# Patient Record
Sex: FEMALE | URBAN - METROPOLITAN AREA
[De-identification: names, ages, dates, MRNs, and addresses within clinical notes are randomized per-mention and may not be internally consistent; named-entity substitution may affect disease eponyms.]

---

## 2019-03-27 ENCOUNTER — Encounter (OUTPATIENT)
Dept: URBAN - METROPOLITAN AREA CLINIC 71 | Facility: CLINIC | Age: 74
End: 2019-03-27
Payer: COMMERCIAL

## 2019-03-27 PROCEDURE — 92014 COMPRE OPH EXAM EST PT 1/>: CPT | Performed by: OPTOMETRIST

## 2019-09-11 ENCOUNTER — Encounter (OUTPATIENT)
Dept: URBAN - METROPOLITAN AREA CLINIC 71 | Facility: CLINIC | Age: 74
End: 2019-09-11
Payer: COMMERCIAL

## 2019-09-11 PROCEDURE — 92014 COMPRE OPH EXAM EST PT 1/>: CPT | Performed by: OPTOMETRIST

## 2019-10-03 ENCOUNTER — Encounter (OUTPATIENT)
Dept: URBAN - METROPOLITAN AREA CLINIC 71 | Facility: CLINIC | Age: 74
End: 2019-10-03
Payer: COMMERCIAL

## 2019-10-03 PROCEDURE — 92015 DETERMINE REFRACTIVE STATE: CPT | Performed by: OPTOMETRIST

## 2020-02-26 ENCOUNTER — OFFICE VISIT (OUTPATIENT)
Dept: URBAN - METROPOLITAN AREA CLINIC 71 | Facility: CLINIC | Age: 75
End: 2020-02-26
Payer: COMMERCIAL

## 2020-02-26 DIAGNOSIS — H43.813 VITREOUS DEGENERATION, BILATERAL: ICD-10-CM

## 2020-02-26 PROCEDURE — 92014 COMPRE OPH EXAM EST PT 1/>: CPT | Performed by: OPTOMETRIST

## 2020-02-26 ASSESSMENT — INTRAOCULAR PRESSURE
OD: 15
OS: 14

## 2020-02-26 NOTE — IMPRESSION/PLAN
Impression: Combined forms of age-related cataract, bilateral: H25.813. Bilateral. Plan: Cataracts account for some decreased vision, however, the patient is aware with macular changes vision will fluctuate.

## 2020-02-26 NOTE — IMPRESSION/PLAN
Impression: Vitreous degeneration, bilateral: H43.813. Bilateral.
Optos done 02/26/2020 Plan: Stable, observe.

## 2020-02-26 NOTE — IMPRESSION/PLAN
Impression: Type 2 diabetes mellitus without complications: N68.4. Bilateral. Plan: The clinical exam is consistent with Type 2 DM without retinopathy. The patient was advised to maintain tight blood sugar, blood pressure, and lipid control, and to see us again in 1 year for a repeat dilated fundus examination.

## 2020-02-26 NOTE — IMPRESSION/PLAN
Impression: Nonexudative age-related macular degeneration, early dry stage: H35.3191. Bilateral.
OCT done 02/26/2020- 248, 248 Plan: The clinical exam and OCT are consistent with dry age-related macular degeneration. The diagnosis, natural history, and prognosis of dry AMD were discussed. The patient was instructed to begin taking AREDS protocol antioxidant vitamins. The use of an Amsler grid and the importance of weekly self-monitoring were reviewed. The patient was instructed to call our office immediately upon any decreased vision or metamorphopsia.

## 2020-08-25 ENCOUNTER — OFFICE VISIT (OUTPATIENT)
Dept: URBAN - METROPOLITAN AREA CLINIC 71 | Facility: CLINIC | Age: 75
End: 2020-08-25
Payer: COMMERCIAL

## 2020-08-25 PROCEDURE — 92014 COMPRE OPH EXAM EST PT 1/>: CPT | Performed by: OPTOMETRIST

## 2020-08-25 PROCEDURE — 92134 CPTRZ OPH DX IMG PST SGM RTA: CPT | Performed by: OPTOMETRIST

## 2020-08-25 ASSESSMENT — INTRAOCULAR PRESSURE
OS: 12
OD: 10

## 2020-08-25 ASSESSMENT — KERATOMETRY
OD: 46.50
OS: 45.75

## 2020-08-25 NOTE — IMPRESSION/PLAN
Impression: Type 2 diabetes mellitus without complications: O35.0. Bilateral. stable Plan: The clinical exam is consistent with Type 2 DM without retinopathy. The patient was advised to maintain tight blood sugar, blood pressure, and lipid control, and to see us again in 1 year for a repeat dilated fundus examination.

## 2020-08-25 NOTE — IMPRESSION/PLAN
Impression: Combined forms of age-related cataract, bilateral: H25.813. Bilateral. will discuss cataract sx and will glare and ref at pt's convenience   Plan: Cataracts account for some decreased vision, however, the patient is aware with macular changes vision will fluctuate.

## 2020-08-25 NOTE — IMPRESSION/PLAN
Impression: Nonexudative age-related macular degeneration, early dry stage: H35.3191. Bilateral.
OCT done 8/25/20 stable- drusen Plan: Discussed diagnosis in detail with patient. Discussed treatment options with patient. Will continue to observe condition and or symptoms. Vitamins recommended. Call if [address] worsens.

## 2020-10-13 ENCOUNTER — OFFICE VISIT (OUTPATIENT)
Dept: URBAN - METROPOLITAN AREA CLINIC 71 | Facility: CLINIC | Age: 75
End: 2020-10-13
Payer: COMMERCIAL

## 2020-10-13 DIAGNOSIS — H52.4 PRESBYOPIA: Primary | ICD-10-CM

## 2020-10-13 PROCEDURE — 99212 OFFICE O/P EST SF 10 MIN: CPT | Performed by: OPTOMETRIST

## 2020-10-13 PROCEDURE — 92012 INTRM OPH EXAM EST PATIENT: CPT | Performed by: OPTOMETRIST

## 2020-10-13 ASSESSMENT — INTRAOCULAR PRESSURE
OS: 10
OD: 10

## 2020-10-13 ASSESSMENT — VISUAL ACUITY
OS: 20/30
OD: 20/30

## 2021-04-13 ENCOUNTER — OFFICE VISIT (OUTPATIENT)
Dept: URBAN - METROPOLITAN AREA CLINIC 71 | Facility: CLINIC | Age: 76
End: 2021-04-13
Payer: COMMERCIAL

## 2021-04-13 DIAGNOSIS — H35.3191 NONEXUDATIVE AGE-RELATED MACULAR DEGENERATION, UNSPECIFIED EYE, EARLY DRY STAGE: Primary | ICD-10-CM

## 2021-04-13 DIAGNOSIS — H25.813 COMBINED FORMS OF AGE-RELATED CATARACT, BILATERAL: ICD-10-CM

## 2021-04-13 DIAGNOSIS — E11.9 TYPE 2 DIABETES MELLITUS WITHOUT COMPLICATIONS: ICD-10-CM

## 2021-04-13 PROCEDURE — 92133 CPTRZD OPH DX IMG PST SGM ON: CPT | Performed by: OPTOMETRIST

## 2021-04-13 PROCEDURE — 92134 CPTRZ OPH DX IMG PST SGM RTA: CPT | Performed by: OPTOMETRIST

## 2021-04-13 PROCEDURE — 99214 OFFICE O/P EST MOD 30 MIN: CPT | Performed by: OPTOMETRIST

## 2021-04-13 ASSESSMENT — INTRAOCULAR PRESSURE
OS: 15
OD: 12

## 2021-04-13 NOTE — IMPRESSION/PLAN
Impression: Type 2 diabetes mellitus without complications: S06.5 Bilateral. Plan: The clinical exam is consistent with Type 2 DM without retinopathy. The patient was advised to maintain tight blood sugar, blood pressure, and lipid control, and to see us again in 1 year for a repeat dilated fundus examination.

## 2021-04-13 NOTE — IMPRESSION/PLAN
Impression: Vitreous degeneration, bilateral: H43.813 Bilateral. Plan: The PVD is stable, and there is no evidence of a retinal tear or detachment on dilated exam.  I again reviewed the signs and symptoms of a retinal tear and detachment in detail with the patient, including worsening flashes, new floaters, and development of a shadow/curtain in the peripheral visual field. The patient was advised to call immediately with any changes to [address] [facility] or increase in symptoms.

## 2021-04-13 NOTE — IMPRESSION/PLAN
Impression: Combined forms of age-related cataract, bilateral: H25.813 Bilateral. Plan: Vision stable OU. No treatment recommended at this time. PT reports her vision is still stable.  Will continue to monitor until vision/glare worsens

## 2021-04-13 NOTE — IMPRESSION/PLAN
Impression: Nonexudative age-related macular degeneration, unspecified eye, early dry stage: H35.3191. Plan: The clinical exam and OCT are stable without evidence of progression. The patient was advised to continue AREDs multivitamins. The patient was also advised to continue to monitor the Amsler grid, avoid smoking, and call us immediately with any visual changes.

## 2021-10-13 ENCOUNTER — OFFICE VISIT (OUTPATIENT)
Dept: URBAN - METROPOLITAN AREA CLINIC 71 | Facility: CLINIC | Age: 76
End: 2021-10-13
Payer: COMMERCIAL

## 2021-10-13 PROCEDURE — 92014 COMPRE OPH EXAM EST PT 1/>: CPT | Performed by: OPTOMETRIST

## 2021-10-13 ASSESSMENT — VISUAL ACUITY
OD: 20/25
OS: 20/25

## 2021-10-13 ASSESSMENT — INTRAOCULAR PRESSURE
OD: 8
OS: 8

## 2021-10-13 NOTE — IMPRESSION/PLAN
Impression: Presbyopia: H52.4. Plan: Presbyopia is the inability to focus on objects (ie: accommodate) due to the loss of flexibility of your natural lens. Presbyopia occurs with age. Reading glasses, bifocals, trifocals or contacts can be helpful. Contact the office if difficulty focusing persists despite corrective eye wear. New glasses RX given today for bifocals. Continue to follow annually for vision exams.

## 2021-10-13 NOTE — IMPRESSION/PLAN
Impression: Combined forms of age-related cataract, bilateral: H25.813 Bilateral. Plan: Cataracts are moderate and discussed the option of CAT SX vs waiting. She notes her vision does not bother her at this time and wants to wait another year. No treatment currently recommended. The patient will monitor vision changes and contact us with any decrease in vision.

## 2021-10-13 NOTE — IMPRESSION/PLAN
Impression: Nonexudative age-related macular degeneration, bilateral, early dry stage: H35.3131. Plan: The clinical exam and OCT are stable without evidence of progression. The patient was advised to continue AREDs multivitamins. The patient was also advised to continue to monitor the Amsler grid and call us immediately with any visual changes. 
Will have PT back N/A for continued observation of AMD OU

## 2022-04-05 ENCOUNTER — OFFICE VISIT (OUTPATIENT)
Dept: URBAN - METROPOLITAN AREA CLINIC 71 | Facility: CLINIC | Age: 77
End: 2022-04-05
Payer: COMMERCIAL

## 2022-04-05 DIAGNOSIS — H35.3131 NONEXUDATIVE AGE-RELATED MACULAR DEGENERATION, BILATERAL, EARLY DRY STAGE: Primary | ICD-10-CM

## 2022-04-05 PROCEDURE — 99214 OFFICE O/P EST MOD 30 MIN: CPT | Performed by: OPTOMETRIST

## 2022-04-05 PROCEDURE — 92134 CPTRZ OPH DX IMG PST SGM RTA: CPT | Performed by: OPTOMETRIST

## 2022-04-05 NOTE — IMPRESSION/PLAN
Impression: Type 2 diabetes mellitus without complications: M40.4 Bilateral. Plan: The clinical exam is consistent with Type 2 DM without retinopathy. The patient was advised to maintain tight blood sugar, blood pressure, and lipid control, and to see us again in 1 year for a repeat dilated fundus examination.

## 2022-04-05 NOTE — IMPRESSION/PLAN
Impression: Combined forms of age-related cataract, bilateral: H25.813 Bilateral. Plan: Cataracts are stable and PT notes her vision is not bothering her at this time. No treatment currently recommended. The patient will monitor vision changes and contact us with any decrease in vision.

## 2022-04-05 NOTE — IMPRESSION/PLAN
Impression: Nonexudative age-related macular degeneration, bilateral, early dry stage: H35.3131. Plan: The clinical exam and OCT are stable without evidence of progression. The patient was advised to continue the preservision vitamins. The patient was also advised to continue to monitor the Amsler grid and call us immediately with any visual changes.

## 2023-04-11 ENCOUNTER — OFFICE VISIT (OUTPATIENT)
Dept: URBAN - METROPOLITAN AREA CLINIC 71 | Facility: CLINIC | Age: 78
End: 2023-04-11
Payer: MEDICARE

## 2023-04-11 DIAGNOSIS — H25.813 COMBINED FORMS OF AGE-RELATED CATARACT, BILATERAL: ICD-10-CM

## 2023-04-11 DIAGNOSIS — E11.9 TYPE 2 DIABETES MELLITUS WITHOUT COMPLICATIONS: Primary | ICD-10-CM

## 2023-04-11 DIAGNOSIS — H43.813 VITREOUS DEGENERATION, BILATERAL: ICD-10-CM

## 2023-04-11 DIAGNOSIS — H35.3191 NONEXUDATIVE AGE-RELATED MACULAR DEGENERATION, EARLY DRY STAGE: ICD-10-CM

## 2023-04-11 DIAGNOSIS — H52.4 PRESBYOPIA: ICD-10-CM

## 2023-04-11 PROCEDURE — 99214 OFFICE O/P EST MOD 30 MIN: CPT | Performed by: OPTOMETRIST

## 2023-04-11 ASSESSMENT — INTRAOCULAR PRESSURE
OS: 13
OD: 17

## 2023-04-11 ASSESSMENT — VISUAL ACUITY
OD: 20/30
OS: 20/30

## 2023-04-11 NOTE — IMPRESSION/PLAN
Impression: Vitreous degeneration, bilateral: H43.813. PVD, floaters present OU, Stable OU. Plan: Continue to Monitor annually. Posterior vitreous detachments (PVD) usually diminish with time, but it may take several months. They rarely disappear entirely. PVD is a normal aging change due to vitreoius jelly pulling away from the retinal lining of the eye. They may accompany retinal tears, holes or detachments, so a dilated exam is important. Contact the office if symptoms worsen, including increased floaters, flashing light, loss of vision or a black curtain blocking your field of vision occurs.

## 2023-04-11 NOTE — IMPRESSION/PLAN
Impression: Type 2 diabetes mellitus without complications: A23.7 Bilateral.
No diabetic Retinopathy found today. OU stable. Plan: Monitor Annually.

## 2023-04-11 NOTE — IMPRESSION/PLAN
Impression: Combined forms of age-related cataract, bilateral: H25.813 Bilateral.
Patient VA is still at 20/30 with correction. OU stable. Offered cat sx when patient is ready. Plan: Cataracts account for the patient's complaints. No treatment currently recommended. The patient will monitor vision changes and contact us with any decrease in vision.

## 2023-04-11 NOTE — IMPRESSION/PLAN
Impression: Nonexudative age-related macular degeneration, early dry stage: H35.3191 Bilateral.
Small drusen and pigmentary change OU has held stable since lat year. Plan: Drusen may be an early sign of macular degeneration and this diagnosis may be possible in the future. There are no specific modalities proven to be effective to treat drusen. A diet rich in green vegetables is recommended. It is important to follow the amsler grid and report any changes such as increase in distortion or blurring of lines. If drusen progress they can cause decrease in vision or distortion of vision. Avoidance of smoking is recommended. Contact the office if you experience decrease in vision, blurred vision, distoriton in vision or concerning changes in vision. Take eye vitamins and check amsler grid daily. Monitor Annually.

## 2024-02-27 ENCOUNTER — OFFICE VISIT (OUTPATIENT)
Dept: URBAN - METROPOLITAN AREA CLINIC 71 | Facility: CLINIC | Age: 79
End: 2024-02-27
Payer: MEDICARE

## 2024-02-27 DIAGNOSIS — H43.813 VITREOUS DEGENERATION, BILATERAL: ICD-10-CM

## 2024-02-27 DIAGNOSIS — H52.4 PRESBYOPIA: ICD-10-CM

## 2024-02-27 DIAGNOSIS — H25.813 COMBINED FORMS OF AGE-RELATED CATARACT, BILATERAL: ICD-10-CM

## 2024-02-27 DIAGNOSIS — H35.3191 NONEXUDATIVE AGE-RELATED MACULAR DEGENERATION, UNSPECIFIED EYE, EARLY DRY STAGE: Primary | ICD-10-CM

## 2024-02-27 DIAGNOSIS — E11.9 TYPE 2 DIABETES MELLITUS WITHOUT COMPLICATIONS: ICD-10-CM

## 2024-02-27 PROCEDURE — 92134 CPTRZ OPH DX IMG PST SGM RTA: CPT | Performed by: OPTOMETRIST

## 2024-02-27 PROCEDURE — 99214 OFFICE O/P EST MOD 30 MIN: CPT | Performed by: OPTOMETRIST

## 2024-02-27 RX ORDER — ATORVASTATIN CALCIUM 80 MG/1
80 MG TABLET, FILM COATED ORAL
Qty: 0 | Refills: 0 | Status: ACTIVE
Start: 2024-02-27

## 2024-02-27 ASSESSMENT — VISUAL ACUITY
OD: 20/40
OS: 20/50

## 2024-02-27 ASSESSMENT — INTRAOCULAR PRESSURE
OD: 9
OS: 11

## 2025-02-12 ENCOUNTER — OFFICE VISIT (OUTPATIENT)
Dept: URBAN - METROPOLITAN AREA CLINIC 71 | Facility: CLINIC | Age: 80
End: 2025-02-12
Payer: MEDICARE

## 2025-02-12 DIAGNOSIS — H52.4 PRESBYOPIA: ICD-10-CM

## 2025-02-12 DIAGNOSIS — E11.9 TYPE 2 DIABETES MELLITUS WITHOUT COMPLICATIONS: Primary | ICD-10-CM

## 2025-02-12 DIAGNOSIS — H35.363 BILATERAL DRUSEN OF MACULA: ICD-10-CM

## 2025-02-12 DIAGNOSIS — H25.813 COMBINED FORMS OF AGE-RELATED CATARACT, BILATERAL: ICD-10-CM

## 2025-02-12 DIAGNOSIS — H43.813 VITREOUS DEGENERATION, BILATERAL: ICD-10-CM

## 2025-02-12 PROCEDURE — 99213 OFFICE O/P EST LOW 20 MIN: CPT | Performed by: OPTOMETRIST

## 2025-02-12 PROCEDURE — 92015 DETERMINE REFRACTIVE STATE: CPT | Performed by: OPTOMETRIST

## 2025-02-12 ASSESSMENT — VISUAL ACUITY
OS: 20/50
OD: 20/50

## 2025-02-12 ASSESSMENT — INTRAOCULAR PRESSURE
OD: 9
OS: 11

## 2025-03-19 ENCOUNTER — OFFICE VISIT (OUTPATIENT)
Dept: URBAN - METROPOLITAN AREA CLINIC 71 | Facility: CLINIC | Age: 80
End: 2025-03-19
Payer: MEDICARE

## 2025-03-19 DIAGNOSIS — H43.813 VITREOUS DEGENERATION, BILATERAL: Primary | ICD-10-CM

## 2025-03-19 DIAGNOSIS — H43.821 VITREOMACULAR ADHESION, RIGHT EYE: ICD-10-CM

## 2025-03-19 DIAGNOSIS — H25.813 COMBINED FORMS OF AGE-RELATED CATARACT, BILATERAL: ICD-10-CM

## 2025-03-19 PROCEDURE — 92133 CPTRZD OPH DX IMG PST SGM ON: CPT | Performed by: OPTOMETRIST

## 2025-03-19 PROCEDURE — 99214 OFFICE O/P EST MOD 30 MIN: CPT | Performed by: OPTOMETRIST

## 2025-03-19 PROCEDURE — 92134 CPTRZ OPH DX IMG PST SGM RTA: CPT | Performed by: OPTOMETRIST

## 2025-03-19 PROCEDURE — 92015 DETERMINE REFRACTIVE STATE: CPT | Performed by: OPTOMETRIST

## 2025-03-19 ASSESSMENT — VISUAL ACUITY
OS: 20/40
OD: 20/40

## 2025-03-19 ASSESSMENT — INTRAOCULAR PRESSURE
OD: 10
OS: 11

## 2025-04-03 ENCOUNTER — TECH ONLY (OUTPATIENT)
Dept: URBAN - METROPOLITAN AREA CLINIC 71 | Facility: CLINIC | Age: 80
End: 2025-04-03
Payer: MEDICARE

## 2025-04-03 DIAGNOSIS — H25.813 COMBINED FORMS OF AGE-RELATED CATARACT, BILATERAL: Primary | ICD-10-CM

## 2025-05-28 ENCOUNTER — OFFICE VISIT (OUTPATIENT)
Dept: URBAN - METROPOLITAN AREA CLINIC 71 | Facility: CLINIC | Age: 80
End: 2025-05-28
Payer: MEDICARE

## 2025-05-28 DIAGNOSIS — H43.821 VITREOMACULAR ADHESION, RIGHT EYE: ICD-10-CM

## 2025-05-28 DIAGNOSIS — H35.3191 NONEXUDATIVE AGE-RELATED MACULAR DEGENERATION, EARLY DRY STAGE: ICD-10-CM

## 2025-05-28 DIAGNOSIS — H43.813 VITREOUS DEGENERATION, BILATERAL: ICD-10-CM

## 2025-05-28 DIAGNOSIS — H04.123 DRY EYE SYNDROME OF BILATERAL LACRIMAL GLANDS: ICD-10-CM

## 2025-05-28 DIAGNOSIS — H25.813 COMBINED FORMS OF AGE-RELATED CATARACT, BILATERAL: Primary | ICD-10-CM

## 2025-05-28 DIAGNOSIS — H52.4 PRESBYOPIA: ICD-10-CM

## 2025-05-28 PROCEDURE — 92015 DETERMINE REFRACTIVE STATE: CPT | Performed by: OPHTHALMOLOGY

## 2025-05-28 PROCEDURE — 99204 OFFICE O/P NEW MOD 45 MIN: CPT | Performed by: OPHTHALMOLOGY

## 2025-05-28 RX ORDER — KETOROLAC TROMETHAMINE 5 MG/ML
0.5 % SOLUTION OPHTHALMIC
Qty: 5 | Refills: 1 | Status: ACTIVE
Start: 2025-05-28

## 2025-05-28 ASSESSMENT — VISUAL ACUITY
OD: 20/40
OS: 20/40

## 2025-05-28 ASSESSMENT — INTRAOCULAR PRESSURE
OS: 10
OD: 8

## 2025-08-19 ENCOUNTER — SURGERY (OUTPATIENT)
Dept: URBAN - METROPOLITAN AREA SURGERY 45 | Facility: SURGERY | Age: 80
End: 2025-08-19
Payer: MEDICARE

## 2025-08-19 ENCOUNTER — SURGERY (OUTPATIENT)
Dept: URBAN - METROPOLITAN AREA SURGERY 44 | Facility: SURGERY | Age: 80
End: 2025-08-19
Payer: MEDICARE

## 2025-08-19 PROCEDURE — CRBAL CREDIT BALANCE: CUSTOM | Performed by: OPHTHALMOLOGY

## 2025-08-19 PROCEDURE — 66984 XCAPSL CTRC RMVL W/O ECP: CPT | Performed by: OPHTHALMOLOGY

## 2025-08-20 ENCOUNTER — POST-OPERATIVE VISIT (OUTPATIENT)
Dept: URBAN - METROPOLITAN AREA CLINIC 75 | Facility: CLINIC | Age: 80
End: 2025-08-20
Payer: MEDICARE

## 2025-08-20 DIAGNOSIS — Z48.810 ENCOUNTER FOR SURGICAL AFTERCARE FOLLOWING SURGERY ON A SENSE ORGAN: Primary | ICD-10-CM

## 2025-08-20 PROCEDURE — 99024 POSTOP FOLLOW-UP VISIT: CPT | Performed by: OPTOMETRIST

## 2025-08-20 ASSESSMENT — INTRAOCULAR PRESSURE
OD: 9
OS: 11